# Patient Record
Sex: FEMALE | Race: WHITE | NOT HISPANIC OR LATINO | Employment: UNEMPLOYED | URBAN - METROPOLITAN AREA
[De-identification: names, ages, dates, MRNs, and addresses within clinical notes are randomized per-mention and may not be internally consistent; named-entity substitution may affect disease eponyms.]

---

## 2022-07-05 ENCOUNTER — TRANSCRIBE ORDERS (OUTPATIENT)
Dept: ADMINISTRATIVE | Facility: HOSPITAL | Age: 52
End: 2022-07-05

## 2022-07-05 DIAGNOSIS — R13.10 DYSPHAGIA, UNSPECIFIED TYPE: Primary | ICD-10-CM

## 2022-07-19 ENCOUNTER — TRANSCRIBE ORDERS (OUTPATIENT)
Dept: ADMINISTRATIVE | Facility: HOSPITAL | Age: 52
End: 2022-07-19

## 2022-07-19 DIAGNOSIS — Z11.59 SCREENING FOR VIRAL DISEASE: Primary | ICD-10-CM

## 2022-07-26 ENCOUNTER — LAB (OUTPATIENT)
Dept: LAB | Facility: HOSPITAL | Age: 52
End: 2022-07-26

## 2022-07-26 ENCOUNTER — APPOINTMENT (OUTPATIENT)
Dept: LAB | Facility: HOSPITAL | Age: 52
End: 2022-07-26

## 2022-07-26 DIAGNOSIS — Z11.59 SCREENING FOR VIRAL DISEASE: ICD-10-CM

## 2022-07-26 PROCEDURE — U0004 COV-19 TEST NON-CDC HGH THRU: HCPCS

## 2022-07-26 PROCEDURE — C9803 HOPD COVID-19 SPEC COLLECT: HCPCS

## 2022-07-27 LAB — SARS-COV-2 RNA PNL SPEC NAA+PROBE: NOT DETECTED

## 2022-07-28 ENCOUNTER — HOSPITAL ENCOUNTER (OUTPATIENT)
Dept: GENERAL RADIOLOGY | Facility: HOSPITAL | Age: 52
Discharge: HOME | End: 2022-07-28
Admitting: OTOLARYNGOLOGY

## 2022-07-28 DIAGNOSIS — R13.10 DYSPHAGIA, UNSPECIFIED TYPE: ICD-10-CM

## 2022-07-28 PROCEDURE — 92611 MOTION FLUOROSCOPY/SWALLOW: CPT

## 2022-07-28 PROCEDURE — 74230 X-RAY XM SWLNG FUNCJ C+: CPT

## 2022-07-28 NOTE — MBS/VFSS/FEES
Outpatient - Speech Language Pathology   Swallow Modified Barium Swallow Study  [facility]     Patient Name: Luz Bosch  : 1970  MRN: [id number]  Today's Date: 2022               Admit Date: 2022    Visit Dx:     ICD-10-CM ICD-9-CM   1. Dysphagia, unspecified type  R13.10 787.20     There is no problem list on file for this patient.    Past Medical History:   Diagnosis Date   • SVT (supraventricular tachycardia) (CMS/HCC)      Past Surgical History:   Procedure Laterality Date   • CARDIAC ABLATION     • HYSTERECTOMY     • SHOULDER SURGERY         SLP Recommendation and Plan  SLP Swallowing Diagnosis: functional oral phase, functional pharyngeal phase, esophageal dysphagia (22 101)  SLP Diet Recommendation: regular textures, thin liquids (22)  Recommended Precautions and Strategies: general aspiration precautions (22)  SLP Rec. for Method of Medication Administration: meds whole, with thin liquids (22 101)     Monitor for Signs of Aspiration: no (22 101)  Recommended Diagnostics: No further SLP services recommended (22 1010)  Swallow Criteria for Skilled Therapeutic Interventions Met: no problems identified which require skilled intervention (22 101)  Anticipated Discharge Disposition (SLP): home (22 101)     Therapy Frequency (Swallow): evaluation only (22)     Demonstrates Need for Referral to Another Service: gastroenterology, dedicated esophageal assessment (22)                                      SWALLOW EVALUATION (last 72 hours)     SLP Adult Swallow Evaluation     Row Name 22                   Rehab Evaluation    Document Type other (see comments)  MBSS  -TM        Subjective Information complains of  food sticking in upper sternal area of chest, difficulty swallowing  -TM        Patient Observations alert;cooperative  -TM        Patient/Family/Caregiver Comments/Observations no family  present  -TM        Patient Effort excellent  -TM        Symptoms Noted During/After Treatment none  -TM                  General Information    Patient Profile Reviewed yes  -TM        Current Method of Nutrition regular textures;thin liquids  -TM        Precautions/Limitations, Vision WFL  -TM        Precautions/Limitations, Hearing WFL  -TM        Prior Level of Function-Communication WFL  -TM        Prior Level of Function-Swallowing no diet consistency restrictions  -TM        Plans/Goals Discussed with patient  -TM        Barriers to Rehab none identified  -TM        Patient's Goals for Discharge eat/drink without coughing/choking  eat/drink without difficulty  -TM                  Pain    Additional Documentation Pain Scale: Numbers Pre/Post-Treatment (Group)  -TM                  Pain Scale: Numbers Pre/Post-Treatment    Pretreatment Pain Rating 0/10 - no pain  -TM        Posttreatment Pain Rating 0/10 - no pain  -TM                  Oral Motor Structure and Function    Oral Lesions or Structural Abnormalities and/or variants none identified  -TM        Dentition Assessment natural, present and adequate  -TM        Secretion Management WNL/WFL  -TM        Mucosal Quality moist, healthy  -TM        Volitional Cough WFL  -TM                  Oral Musculature and Cranial Nerve Assessment    Oral Motor General Assessment WFL  -TM                  Respiratory    Respiratory Status WFL;room air  -TM                  MBS/VFSS    Utensils Used spoon;cup;straw  -TM        Consistencies Trialed regular textures;mechanical soft, no mixed consistencies;pudding thick;honey-thick liquids;nectar/syrup-thick liquids;thin liquids  -TM                  MBS/VFSS Interpretation    Oral Prep Phase WFL  -TM        Oral Transit Phase WFL  -TM        Oral Residue WFL  -TM        VFSS Summary MBSS completed with pt. seated upright in dedicated chair for exam.  Pt. voiced complaints of difficulty swallowing including of globus in  upper sternal area of chest and some feeling of food sticking in throat and difficulty initiating swallow.  Pt. was given trials of regular and mechanical soft solids, pudding-thick, honey-thick, nectar-thick, and thin liquids.  Oral phase was WFL with all trials and consistencies.  Min-mild pharyngeal phase findings of delayed initaition of pharyngeal swallow with bolus pooling in valleculae with all consistencies trialed, and further loss to the pyriforms prior to swallow with thin liquid. No laryngeal penetration or aspiration during the exam and pt. at relatively low risk.  She voiced complaints of globus sensation in upper chest during the exam with occasional trials, but unable to identify the source as the MBSS is limited to oropharyngeal regions.  Pt. did exhibit some very small cervical osteophytes at approximate level C5-7 and suspect pt. may have some hypersensitivity at times with some consistencies that may contribute specifically to her pharyngeal complaints, but not corellated to globus in upper chest. Pt. would benefit from further esophageal testing to determine the etiology of her complaints.  Recommend:  1. continue regular diet with thin liq as sherice, 2. meds whole with thin liq, 3. aspiration precautions, 4. pt. would benefit from GI consult to determine nature of complaints as possibly related to esophagus.  -TM                  Initiation of Pharyngeal Swallow    Initiation of Pharyngeal Swallow bolus in valleculae;bolus in pyriform sinuses  -TM        Pharyngeal Phase functional pharyngeal phase of swallowing;other (see comments)  minimally impaired but gernerally functional  -TM                  Esophageal Phase    Esophageal Phase, Comment suspect third stage dysphagia  -TM                  SLP Communication to Radiology    Severity Level of Dysphagia suspected esophageal dysfunction;pharyngeal dysfunction  -TM        Summary Statement minimal pharyngeal phase findings; third stage dysphagia  suspected  -TM                  SLP Evaluation Clinical Impression    SLP Swallowing Diagnosis functional oral phase;functional pharyngeal phase;esophageal dysphagia  -TM        Functional Impact risk of aspiration/pneumonia  low risk  -TM        Swallow Criteria for Skilled Therapeutic Interventions Met no problems identified which require skilled intervention  -TM                  Recommendations    Therapy Frequency (Swallow) evaluation only  -        SLP Diet Recommendation regular textures;thin liquids  -TM        Recommended Diagnostics No further SLP services recommended  -TM        Recommended Precautions and Strategies general aspiration precautions  -TM        Oral Care Recommendations Toothbrush  -TM        SLP Rec. for Method of Medication Administration meds whole;with thin liquids  -        Monitor for Signs of Aspiration no  -TM        Anticipated Discharge Disposition (SLP) home  -TM        Demonstrates Need for Referral to Another Service gastroenterology;dedicated esophageal assessment  -              User Key  (r) = Recorded By, (t) = Taken By, (c) = Cosigned By    Initials Name Effective Dates    TM Soniya Nova 06/16/21 -                 EDUCATION  The patient has been educated in the following areas:   Dysphagia (Swallowing Impairment) Oral Care/Hydration.              Time Calculation:    Time Calculation- SLP     Row Name 07/28/22 1423             Time Calculation- SLP    SLP Start Time 1010  -TM      SLP Received On 07/28/22  -              Untimed Charges    SLP Eval/Re-eval  [facility] Flu[facility] Eval Swallow - 09511  -            User Key  (r) = Recorded By, (t) = Taken By, (c) = Cosigned By    Initials Name Provider Type     Soniya Nova Speech and Language Pathologist                Therapy Charges for Today     Code Description Service Date Service Provider Modifiers Qty    [id number] HC [facility] FLU[facility]O EVAL SWALLOW 6 7/28/2022 Soniya Nova GN 1                Soniya Nova  7/28/2022

## 2022-10-18 ENCOUNTER — OFFICE VISIT (OUTPATIENT)
Dept: GASTROENTEROLOGY | Facility: CLINIC | Age: 52
End: 2022-10-18

## 2022-10-18 VITALS
SYSTOLIC BLOOD PRESSURE: 110 MMHG | DIASTOLIC BLOOD PRESSURE: 78 MMHG | HEIGHT: 63 IN | BODY MASS INDEX: 20.38 KG/M2 | WEIGHT: 115 LBS

## 2022-10-18 DIAGNOSIS — R13.19 ESOPHAGEAL DYSPHAGIA: Primary | Chronic | ICD-10-CM

## 2022-10-18 DIAGNOSIS — Z12.11 ENCOUNTER FOR SCREENING FOR MALIGNANT NEOPLASM OF COLON: ICD-10-CM

## 2022-10-18 PROCEDURE — 99203 OFFICE O/P NEW LOW 30 MIN: CPT | Performed by: NURSE PRACTITIONER

## 2022-10-18 RX ORDER — SODIUM CHLORIDE 9 MG/ML
70 INJECTION, SOLUTION INTRAVENOUS CONTINUOUS PRN
Status: CANCELLED | OUTPATIENT
Start: 2022-10-18

## 2022-10-18 NOTE — PATIENT INSTRUCTIONS
The patient should eat relatively soft diet, and should eat in upright position and chew well.  The patient should drink water after 2-3 bites and take medications with liberal amounts of water. Furthermore after eating and taking medications, the patient should remain in upright position for 5-10 minutes.   Upper endoscopy-EGD: The indications, technique, alternatives and potential risk and complications were discussed with the patient including but not limited to bleeding, perforations, missing lesions and anesthetic complications. The patient understands and wishes to proceed with the procedure and has given their verbal consent. Written patient education information was given to the patient.   The patient will call if they have further questions before procedure.

## 2022-10-18 NOTE — PROGRESS NOTES
"     New Patient Consult      Date: 10/18/2022   Patient Name: Luz Bosch  MRN: [id number]  : 1970     Primary Care Provider: Jen Cassidy MD    Chief Complaint   Patient presents with   • Difficulty Swallowing     History of Present Illness: Luz Bosch is a 52 y.o. female who is here today to establish care with gastroenterology for difficulty swallowing.     She has a history of difficulty swallowing liquids for the past year or so that has gradually worsened. She has difficulty swallowing liquids several times per week. It feels liquids become \"stuck\" in the center of her chest. She has occasionally has difficulty swallowing solids such as vitamins, but not as often. If she has a shot of alcohol or gulps drinks quickly it makes her symptoms worse.  She denies any reflux symptoms. She has been seen by the ENT recently for her difficulty swallowing and he put her on a reflux medication but it did not help and she stopped it.       There is no history abdominal pain, nausea or vomiting. NO constipation, diarrhea or rectal bleeding. She had colonoscopy in  at [facility] that was \"normal\" per patient, no polyps removed. She was told to have colonscopy in 10 years at that time. No family history of GI malignancy. She has not had EGD in the past.     Subjective      Past Medical History:   Diagnosis Date   • SVT (supraventricular tachycardia) (HCC)      Past Surgical History:   Procedure Laterality Date   • CARDIAC ABLATION     • COLONOSCOPY     • HYSTERECTOMY     • SHOULDER SURGERY       Family History   Problem Relation Age of Onset   • Colon cancer Neg Hx      Social History     Socioeconomic History   • Marital status:    Tobacco Use   • Smoking status: Former   • Smokeless tobacco: Never   Vaping Use   • Vaping Use: Never used   Substance and Sexual Activity   • Alcohol use: Yes     Comment: occasional   • Drug use: No   • Sexual activity: Defer       Current Outpatient " "Medications:   •  cetirizine (zyrTEC) 10 MG tablet, Take 10 mg by mouth Daily., Disp: , Rfl:   •  Estradiol (MINIVELLE TD), Place  on the skin as directed by provider., Disp: , Rfl:      Allergies   Allergen Reactions   • Lortab [Hydrocodone-Acetaminophen] Other (See Comments)     \"bones itch\"     The following portions of the patient's history were reviewed and updated as appropriate: allergies, current medications, past family history, past medical history, past social history, past surgical history and problem list.    Objective     Physical Exam  Vitals and nursing note reviewed.   Constitutional:       General: She is not in acute distress.     Appearance: Normal appearance. She is well-developed.   HENT:      Head: Normocephalic and atraumatic.      Mouth/Throat:      Mouth: Mucous membranes are not pale, not dry and not cyanotic.   Eyes:      General: Lids are normal.   Neck:      Trachea: Trachea normal.   Cardiovascular:      Rate and Rhythm: Normal rate.   Pulmonary:      Effort: Pulmonary effort is normal. No respiratory distress.      Breath sounds: Normal breath sounds.   Abdominal:      Tenderness: There is no abdominal tenderness.   Skin:     General: Skin is warm and dry.   Neurological:      Mental Status: She is alert and oriented to person, place, and time.   Psychiatric:         Mood and Affect: Mood normal.         Speech: Speech normal.         Behavior: Behavior normal. Behavior is cooperative.       Vitals:    10/18/22 1306   BP: 110/78   Weight: 52.2 kg (115 lb)   Height: 160 cm (63\")     Body mass index is 20.37 kg/m².     Results Review:   I have reviewed the patient's new clinical and imaging results.    Lab on 07/26/2022   Component Date Value Ref Range Status   • COVID19 07/26/2022 Not Detected  Not Detected - Ref. Range Final      Dated 6/17/2022  Hgb 12.9, Hct 37.4, platelets 170, AST 18, ALT 11, total bilirubin 0.5, alk phos 36    FL Video Swallow With Speech Single Contrast     " Result Date: 7/28/2022  Modified barium swallow under fluoroscopic guidance.  No significant episodes of penetration or aspiration across any consistency.  Please see speech pathologist's report for further details and dietary recommendations.   FLUOROSCOPY TIME: 100 s  NUMBER OF CINE SEQUENCES:  9  This report was signed and finalized on 7/28/2022 11:15 AM by Sin Ghosh MD.     Assessment / Plan      1. Esophageal dysphagia  She has a history of difficulty swallowing for the past year or so that has gradually worsened.  It feels like liquids become stuck in the center of the chest several times per week, she has occasional difficulty swallowing solids.  She had video swallow with speech that was unremarkable.  She tried taking PPI per ENT but it did not help with her symptoms.  Patient denies reflux.  Advised eat a softer diet, chew food very well and take sips of water between bites.  Avoid cold beverages.  EGD to rule out esophageal abnormality.    2. Encounter for screening for malignant neoplasm of colon  She had colonoscopy in 2020 at the [facility] that was normal according to the patient.  She was recommended to have colonoscopy in 10 years at that time.  There is no family history of colon cancer.  Colonoscopy for screening in 2030 or sooner if needed.    Patient Instructions   1. The patient should eat relatively soft diet, and should eat in upright position and chew well.  The patient should drink water after 2-3 bites and take medications with liberal amounts of water. Furthermore after eating and taking medications, the patient should remain in upright position for 5-10 minutes.   2. Upper endoscopy-EGD: The indications, technique, alternatives and potential risk and complications were discussed with the patient including but not limited to bleeding, perforations, missing lesions and anesthetic complications. The patient understands and wishes to proceed with the procedure and has given their  verbal consent. Written patient education information was given to the patient.   3. The patient will call if they have further questions before procedure.       Richard Sahni, APRN  10/18/2022    Please note that portions of this note may have been completed with a voice recognition program. Efforts were made to edit the dictations, but occasionally words are mistranscribed.

## 2022-10-24 RX ORDER — VITAMIN E 268 MG
1 CAPSULE ORAL DAILY
COMMUNITY
End: 2022-10-25 | Stop reason: HOSPADM

## 2022-10-24 RX ORDER — MULTIPLE VITAMINS W/ MINERALS TAB 9MG-400MCG
1 TAB ORAL DAILY
COMMUNITY
End: 2022-10-25 | Stop reason: HOSPADM

## 2022-10-24 RX ORDER — MULTIVIT WITH MINERALS/LUTEIN
1000 TABLET ORAL DAILY
COMMUNITY
End: 2022-10-25 | Stop reason: HOSPADM

## 2022-10-24 RX ORDER — ESTRADIOL 0.03 MG/D
1 FILM, EXTENDED RELEASE TRANSDERMAL 2 TIMES WEEKLY
COMMUNITY

## 2022-10-24 RX ORDER — UBIDECARENONE 100 MG
100 CAPSULE ORAL DAILY
COMMUNITY
End: 2022-10-25 | Stop reason: HOSPADM

## 2022-10-24 RX ORDER — BIOTIN 5 MG
1 TABLET ORAL DAILY
COMMUNITY
End: 2022-10-25 | Stop reason: HOSPADM

## 2022-10-25 ENCOUNTER — ANESTHESIA EVENT (OUTPATIENT)
Dept: GASTROENTEROLOGY | Facility: HOSPITAL | Age: 52
End: 2022-10-25

## 2022-10-25 ENCOUNTER — HOSPITAL ENCOUNTER (OUTPATIENT)
Facility: HOSPITAL | Age: 52
Setting detail: HOSPITAL OUTPATIENT SURGERY
Discharge: HOME | End: 2022-10-25
Attending: INTERNAL MEDICINE | Admitting: INTERNAL MEDICINE

## 2022-10-25 ENCOUNTER — ANESTHESIA (OUTPATIENT)
Dept: GASTROENTEROLOGY | Facility: HOSPITAL | Age: 52
End: 2022-10-25

## 2022-10-25 VITALS
TEMPERATURE: 97.6 F | DIASTOLIC BLOOD PRESSURE: 68 MMHG | HEIGHT: 63 IN | RESPIRATION RATE: 16 BRPM | HEART RATE: 77 BPM | WEIGHT: 115 LBS | OXYGEN SATURATION: 100 % | SYSTOLIC BLOOD PRESSURE: 124 MMHG | BODY MASS INDEX: 20.38 KG/M2

## 2022-10-25 DIAGNOSIS — R13.19 ESOPHAGEAL DYSPHAGIA: ICD-10-CM

## 2022-10-25 PROCEDURE — C1726 CATH, BAL DIL, NON-VASCULAR: HCPCS | Performed by: INTERNAL MEDICINE

## 2022-10-25 PROCEDURE — 43239 EGD BIOPSY SINGLE/MULTIPLE: CPT | Performed by: INTERNAL MEDICINE

## 2022-10-25 PROCEDURE — 25010000002 PROPOFOL 10 MG/ML EMULSION: Performed by: NURSE ANESTHETIST, CERTIFIED REGISTERED

## 2022-10-25 PROCEDURE — 88305 TISSUE EXAM BY PATHOLOGIST: CPT

## 2022-10-25 PROCEDURE — 43249 ESOPH EGD DILATION <30 MM: CPT | Performed by: INTERNAL MEDICINE

## 2022-10-25 RX ORDER — SODIUM CHLORIDE 9 MG/ML
INJECTION, SOLUTION INTRAVENOUS CONTINUOUS PRN
Status: DISCONTINUED | OUTPATIENT
Start: 2022-10-25 | End: 2022-10-25 | Stop reason: SURG

## 2022-10-25 RX ORDER — SODIUM CHLORIDE 9 MG/ML
70 INJECTION, SOLUTION INTRAVENOUS CONTINUOUS PRN
Status: DISCONTINUED | OUTPATIENT
Start: 2022-10-25 | End: 2022-10-25 | Stop reason: HOSPADM

## 2022-10-25 RX ORDER — LIDOCAINE HYDROCHLORIDE 20 MG/ML
INJECTION, SOLUTION INTRAVENOUS AS NEEDED
Status: DISCONTINUED | OUTPATIENT
Start: 2022-10-25 | End: 2022-10-25 | Stop reason: SURG

## 2022-10-25 RX ORDER — OMEPRAZOLE 20 MG/1
20 CAPSULE, DELAYED RELEASE ORAL DAILY
Qty: 30 CAPSULE | Refills: 5 | Status: SHIPPED | OUTPATIENT
Start: 2022-10-25

## 2022-10-25 RX ADMIN — SODIUM CHLORIDE: 9 INJECTION, SOLUTION INTRAVENOUS at 08:11

## 2022-10-25 RX ADMIN — PROPOFOL 200 MCG/KG/MIN: 10 INJECTION, EMULSION INTRAVENOUS at 08:14

## 2022-10-25 RX ADMIN — SODIUM CHLORIDE 70 ML/HR: 9 INJECTION, SOLUTION INTRAVENOUS at 07:48

## 2022-10-25 RX ADMIN — LIDOCAINE HYDROCHLORIDE 60 MG: 20 INJECTION, SOLUTION INTRAVENOUS at 08:14

## 2022-10-25 NOTE — ANESTHESIA POSTPROCEDURE EVALUATION
Patient: Luz Bosch    Procedure Summary     Date: 10/25/22 Room / [facility]: [facility] END[facility]COPY [facility]    Anesthesia Start: 0811 Anesthesia Stop: 0828    Procedure: ESOPHAGOGASTRODUODENOSCOPY with dilation and biopsy (Esophagus) Diagnosis:       Esophageal dysphagia      (Esophageal dysphagia [R13.19])    Surgeons: Pallavi Neely MD Provider: Yohannes You CRNA    Anesthesia Type: MAC ASA Status: 2          Anesthesia Type: MAC    Vitals  No vitals data found for the desired time range.          Post Anesthesia Care and Evaluation    Patient location during evaluation: PHASE II  Patient participation: complete - patient participated  Level of consciousness: awake and alert  Pain score: 0  Pain management: satisfactory to patient    Airway patency: patent  Anesthetic complications: No anesthetic complications  PONV Status: none  Cardiovascular status: acceptable and stable  Respiratory status: acceptable, nonlabored ventilation, spontaneous ventilation and unassisted  Hydration status: acceptable    Comments: Vitals signs as noted in nursing documentation as per protocol.

## 2022-10-25 NOTE — ANESTHESIA PREPROCEDURE EVALUATION
Anesthesia Evaluation     Patient summary reviewed and Nursing notes reviewed   no history of anesthetic complications:  NPO Solid Status: > 8 hours  NPO Liquid Status: > 8 hours           Airway   Mallampati: I  TM distance: >3 FB  Neck ROM: full  no difficulty expected  Dental - normal exam     Pulmonary - normal exam   (+) asthma,  Cardiovascular - normal exam    (+) valvular problems/murmurs,       Neuro/Psych- negative ROS  GI/Hepatic/Renal/Endo - negative ROS     Musculoskeletal (-) negative ROS    Abdominal    Substance History - negative use     OB/GYN negative ob/gyn ROS         Other - negative ROS                       Anesthesia Plan    ASA 2     MAC     intravenous induction     Anesthetic plan, risks, benefits, and alternatives have been provided, discussed and informed consent has been obtained with: patient.        CODE STATUS:

## 2022-10-26 LAB — REF LAB TEST METHOD: NORMAL

## 2023-01-16 ENCOUNTER — OFFICE VISIT (OUTPATIENT)
Dept: GASTROENTEROLOGY | Facility: CLINIC | Age: 53
End: 2023-01-16
Payer: COMMERCIAL

## 2023-01-16 VITALS
OXYGEN SATURATION: 97 % | BODY MASS INDEX: 20.38 KG/M2 | SYSTOLIC BLOOD PRESSURE: 102 MMHG | DIASTOLIC BLOOD PRESSURE: 78 MMHG | WEIGHT: 115 LBS | HEART RATE: 85 BPM | HEIGHT: 63 IN | TEMPERATURE: 97.5 F

## 2023-01-16 DIAGNOSIS — Z12.11 ENCOUNTER FOR SCREENING FOR MALIGNANT NEOPLASM OF COLON: ICD-10-CM

## 2023-01-16 DIAGNOSIS — R13.19 ESOPHAGEAL DYSPHAGIA: Primary | ICD-10-CM

## 2023-01-16 PROCEDURE — 99213 OFFICE O/P EST LOW 20 MIN: CPT | Performed by: NURSE PRACTITIONER

## 2023-01-16 NOTE — PROGRESS NOTES
"     Follow Up Note     Date: 2023   Patient Name: Luz Bosch  MRN: [id number]  : 1970     Primary Care Provider: Jen Cassidy MD     Chief Complaint   Patient presents with   • Follow-up   • Difficulty Swallowing     History of present illness:   2023  Luz Bosch is a 52 y.o. female who is here today for follow up for difficulty swallowing. She has not had any further trouble swallowing since her EGD. She is able to eat and drink without problems. No further episodes of burping.     Interval History:  10/18/2022  She has a history of difficulty swallowing liquids for the past year or so that has gradually worsened. She has difficulty swallowing liquids several times per week. It feels liquids become \"stuck\" in the center of her chest. She has occasionally has difficulty swallowing solids such as vitamins, but not as often. If she has a shot of alcohol or gulps drinks quickly it makes her symptoms worse.  She denies any reflux symptoms. She has been seen by the ENT recently for her difficulty swallowing and he put her on a reflux medication but it did not help and she stopped it.        There is no history abdominal pain, nausea or vomiting. NO constipation, diarrhea or rectal bleeding. She had colonoscopy in  at [facility] that was \"normal\" per patient, no polyps removed. She was told to have colonscopy in 10 years at that time. No family history of GI malignancy. She has not had EGD in the past.    Subjective      Past Medical History:   Diagnosis Date   • Ear piercing     both ears   • History of exercise stress test    • History of Holter monitoring     completes on 10/24/22 in the p.m. and returns to Dr. Howell's office on 10/25/22.   • Problems with swallowing     food and liquids   • Seasonal allergies    • SVT (supraventricular tachycardia) (HCC)     s/p ablation   • Tattoos     x2   • Wears glasses     for reading     Past Surgical History:   Procedure Laterality " "Date   • CARDIAC ABLATION     • COLONOSCOPY     • ENDOSCOPY N/A 10/25/2022    Procedure: ESOPHAGOGASTRODUODENOSCOPY with dilation and biopsy;  Surgeon: Pallavi Neely MD;  Location: [facility] ENDOSCOPY;  Service: Gastroenterology;  Laterality: N/A;   • HYSTERECTOMY     • SHOULDER SURGERY Right    • TONSILLECTOMY     • WISDOM TOOTH EXTRACTION       Family History   Problem Relation Age of Onset   • Colon cancer Neg Hx      Social History     Socioeconomic History   • Marital status:    Tobacco Use   • Smoking status: Former     Packs/day: 0.25     Years: 10.00     Pack years: 2.50     Types: Cigarettes     Quit date:      Years since quittin.0   • Smokeless tobacco: Never   Vaping Use   • Vaping Use: Never used   Substance and Sexual Activity   • Alcohol use: Yes     Comment: 1 drink per week   • Drug use: No   • Sexual activity: Defer       Current Outpatient Medications:   •  cetirizine (zyrTEC) 10 MG tablet, Take 10 mg by mouth Daily., Disp: , Rfl:   •  estradiol (VIVELLE-DOT) 0.025 MG/24HR patch, Place 1 patch on the skin as directed by provider 2 (Two) Times a Week. , Wednesday, Disp: , Rfl:   •  omeprazole (priLOSEC) 20 MG capsule, Take 1 capsule by mouth Daily., Disp: 30 capsule, Rfl: 5     Allergies   Allergen Reactions   • Hydrocodone Itching     \"it makes my bones itch\"     The following portions of the patient's history were reviewed and updated as appropriate: allergies, current medications, past family history, past medical history, past social history, past surgical history and problem list.  Objective     Physical Exam  Vitals and nursing note reviewed.   Constitutional:       General: She is not in acute distress.     Appearance: Normal appearance. She is well-developed.   HENT:      Head: Normocephalic and atraumatic.      Mouth/Throat:      Mouth: Mucous membranes are not pale, not dry and not cyanotic.   Eyes:      General: Lids are normal.   Neck:      " "Trachea: Trachea normal.   Cardiovascular:      Rate and Rhythm: Normal rate.   Pulmonary:      Effort: Pulmonary effort is normal. No respiratory distress.      Breath sounds: Normal breath sounds.   Abdominal:      Tenderness: There is no abdominal tenderness.   Skin:     General: Skin is warm and dry.   Neurological:      Mental Status: She is alert and oriented to person, place, and time.   Psychiatric:         Mood and Affect: Mood normal.         Speech: Speech normal.         Behavior: Behavior normal. Behavior is cooperative.       Vitals:    23 1330   BP: 102/78   Pulse: 85   Temp: 97.5 °F (36.4 °C)   TempSrc: Infrared   SpO2: 97%   Weight: 52.2 kg (115 lb)   Height: 160 cm (63\")     Body mass index is 20.37 kg/m².     Results Review:   I reviewed the patient's new clinical results.    Admission on 10/25/2022, Discharged on 10/25/2022   Component Date Value Ref Range Status   • Reference Lab Report 10/25/2022    Final                    Value:[facility]  [address] [place] [address]  Phone: [phone] or [phone]  Fax: [phone]  Camden Guthrie M.D., Medical Director    PATIENT NAME                                     LABORATORY NO.  AZUL IBARRA                                     [id number]  [id number]                                 AGE                    SEX   N              CLIENT REF #  [facility]                    52        1970      F     [id number]      [id number]    [address]                        REQUESTING M.D.           ATTENDING M.D.         COPY TO.   [address]                                NILA JARQUIN [address]  DATE COLLECTED            DATE RECEIVED          DATE REPORTED  10/25/2022                10/25/2022             10/26/2022    DIAGNOSIS:  A.     ANTRUM AND BODY, BIOPSY:  Gastric mucosa with no significant " histopathologic abnormality  No                           H. pylori organisms identified on routine stains  Negative for intestinal metaplasia, dysplasia, or carcinoma    B.     STOMACH, BODY, BIOPSY:  Gastric mucosa with no significant histopathologic abnormality  No H. pylori organisms identified on routine stains  Negative for intestinal metaplasia, dysplasia, or carcinoma    C.     ESOPHAGUS, BIOPSIES, DISTAL, MID, AND PROXIMAL:  Unremarkable squamous mucosa  Negative for dysplasia, carcinoma, or increased intraepithelial eosinophils                            REVIEWED, DIAGNOSED AND ELECTRONICALLY  SIGNED BY:    Sin Castaneda M.D.,F.C.A.P.  CPT CODES:  88305x3        Dated 6/17/2022  Hgb 12.9, Hct 37.4, platelets 170, AST 18, ALT 11, total bilirubin 0.5, alk phos 36     FL Video Swallow With Speech Single Contrast     Result Date: 7/28/2022  Modified barium swallow under fluoroscopic guidance.  No significant episodes of penetration or aspiration across any consistency.  Please see speech pathologist's report for further details and dietary recommendations.       Colonoscopy dated 12/15/2020 per Dr. Heri Mayen  -Normal colonoscopy  -Follow-up colonoscopy in 10 years or sooner if clinically indicated.    EGD dated 10/25/2022 per Dr. Neely  - Normal oropharynx.  - Z-line regular, 35 cm from the incisors.  - No endoscopic esophageal abnormality to explain patient's dysphagia except mild EGJO from hiatal hernia at GEJ. Esophagus dilated. Dilated. Biopsied.  - 2 cm hiatal hernia.  - Normal cardia, gastric fundus, incisura, prepyloric region of the stomach and pylorus. Biopsied.  - Erythematous mucosa in the gastric body. Biopsied.  - Normal duodenal bulb, first portion of the duodenum, second portion of the duodenum and third portion of the duodenum.  A.     ANTRUM AND BODY, BIOPSY:   Gastric mucosa with no significant histopathologic abnormality   No H. pylori organisms identified on routine stains   Negative  for intestinal metaplasia, dysplasia, or carcinoma   B.     STOMACH, BODY, BIOPSY:   Gastric mucosa with no significant histopathologic abnormality   No H. pylori organisms identified on routine stains   Negative for intestinal metaplasia, dysplasia, or carcinoma   C.     ESOPHAGUS, BIOPSIES, DISTAL, MID, AND PROXIMAL:   Unremarkable squamous mucosa   Negative for dysplasia, carcinoma, or increased intraepithelial eosinophils    Assessment / Plan      1. Esophageal dysphagia  She has not had any further episodes of difficulty swallowing since her EGD. Denies reflux.  She is able to eat and drink without problems.  Previous video swallow with speech unremarkable.  EGD with no endoscopic esophageal abnormality to explain her dysphagia except mild EGJO from hiatal hernia GEJ.  Esophagus dilated.  Biopsies unremarkable.  Antireflux measures.  May take omeprazole 20 mg daily as needed for reflux.  Esophageal manometry if dysphagia returns-the patient will call back.    2. Encounter for screening for malignant neoplasm of colon  Colonoscopy in 2020 per Dr. Blanka Mayen unremarkable, no polyps removed.  No family history of colon cancer.  Colonoscopy for screening in 2030, or sooner if needed.    Patient Instructions   1. Antireflux measures: Avoid fried, fatty foods, alcohol, chocolate, coffee, tea,  soft drinks, peppermint and spearmint, spicy foods, tomatoes and tomato based foods, onions, peppers, and smoking.   2. Other antireflux measures include weight reduction if overweight, avoiding tight clothing around the abdomen, elevating the head of the bed 6 inches with blocks under the head board, and don't drink or eat before going to bed and avoid lying down immediately after meals.  3. Avoid vaping/smoking.  4. Omeprazole 20 mg 1 by mouth once a day as needed for reflux.  5. The patient should eat relatively soft diet, and should eat in upright position and chew well.    6. The patient should drink water after 2-3 bites  and take medications with liberal amounts of water.   7. Furthermore after eating and taking medications, the patient should remain in upright position for 5-10 minutes.   8. Patient is to call back if difficulty swallowing returns. Will refer for esophageal manometry.  9. Colonoscopy for screening in 2030.   10. Follow up: The patient will call back    Richard Sahni, APRN  1/16/2023    Please note that portions of this note were completed with a voice recognition program.

## 2023-01-16 NOTE — PATIENT INSTRUCTIONS
Antireflux measures: Avoid fried, fatty foods, alcohol, chocolate, coffee, tea,  soft drinks, peppermint and spearmint, spicy foods, tomatoes and tomato based foods, onions, peppers, and smoking.   Other antireflux measures include weight reduction if overweight, avoiding tight clothing around the abdomen, elevating the head of the bed 6 inches with blocks under the head board, and don't drink or eat before going to bed and avoid lying down immediately after meals.  Avoid vaping/smoking.  Omeprazole 20 mg 1 by mouth once a day as needed for reflux.  The patient should eat relatively soft diet, and should eat in upright position and chew well.    The patient should drink water after 2-3 bites and take medications with liberal amounts of water.   Furthermore after eating and taking medications, the patient should remain in upright position for 5-10 minutes.   Patient is to call back if difficulty swallowing returns. Will refer for esophageal manometry.  Colonoscopy for screening in 2030.   Follow up: The patient will call back

## (undated) DEVICE — SUCTION CANISTER, 1500CC, RIGID: Brand: DEROYAL

## (undated) DEVICE — CONMED SCOPE SAVER BITE BLOCK, 20X27 MM: Brand: SCOPE SAVER

## (undated) DEVICE — HYBRID TUBING/CAP SET FOR OLYMPUS® SCOPES: Brand: ERBE

## (undated) DEVICE — VLV SXN AIR/H2O ORCAPOD3 1P/U STRL

## (undated) DEVICE — ESOPHAGEAL BALLOON DILATATION CATHETER: Brand: CRE FIXED WIRE

## (undated) DEVICE — ENDOSCOPY PORT CONNECTOR FOR OLYMPUS® SCOPES: Brand: ERBE

## (undated) DEVICE — DEV INFL ALLIANCE2 SYS

## (undated) DEVICE — Device

## (undated) DEVICE — FRCP BX RADJAW4 NDL 2.8 240 STD OG